# Patient Record
Sex: MALE | Race: OTHER | Employment: FULL TIME | URBAN - METROPOLITAN AREA
[De-identification: names, ages, dates, MRNs, and addresses within clinical notes are randomized per-mention and may not be internally consistent; named-entity substitution may affect disease eponyms.]

---

## 2022-03-04 ENCOUNTER — APPOINTMENT (OUTPATIENT)
Dept: GENERAL RADIOLOGY | Facility: HOSPITAL | Age: 47
End: 2022-03-04
Attending: EMERGENCY MEDICINE
Payer: COMMERCIAL

## 2022-03-04 ENCOUNTER — HOSPITAL ENCOUNTER (EMERGENCY)
Facility: HOSPITAL | Age: 47
Discharge: HOME | End: 2022-03-04
Attending: EMERGENCY MEDICINE
Payer: COMMERCIAL

## 2022-03-04 VITALS
RESPIRATION RATE: 20 BRPM | HEIGHT: 73 IN | WEIGHT: 180 LBS | SYSTOLIC BLOOD PRESSURE: 120 MMHG | TEMPERATURE: 98 F | BODY MASS INDEX: 23.86 KG/M2 | DIASTOLIC BLOOD PRESSURE: 82 MMHG | HEART RATE: 70 BPM | OXYGEN SATURATION: 100 %

## 2022-03-04 DIAGNOSIS — K21.9 GASTROESOPHAGEAL REFLUX DISEASE, UNSPECIFIED WHETHER ESOPHAGITIS PRESENT: Primary | ICD-10-CM

## 2022-03-04 PROCEDURE — 99283 EMERGENCY DEPT VISIT LOW MDM: CPT

## 2022-03-04 PROCEDURE — 71045 X-RAY EXAM CHEST 1 VIEW: CPT | Performed by: EMERGENCY MEDICINE

## 2022-03-05 NOTE — ED INITIAL ASSESSMENT (HPI)
Pt was at dinner and felt like he had a piece of steak stuck in his throat. pt still feels like he has something stuck in his throat. Pt managing secretions. Respirations nonlabored.